# Patient Record
Sex: FEMALE | Race: BLACK OR AFRICAN AMERICAN | NOT HISPANIC OR LATINO | URBAN - NONMETROPOLITAN AREA
[De-identification: names, ages, dates, MRNs, and addresses within clinical notes are randomized per-mention and may not be internally consistent; named-entity substitution may affect disease eponyms.]

---

## 2020-05-19 ENCOUNTER — OTHER- (OUTPATIENT)
Dept: URBAN - NONMETROPOLITAN AREA CLINIC 3 | Facility: CLINIC | Age: 30
Setting detail: DERMATOLOGY
End: 2020-05-19

## 2020-05-19 DIAGNOSIS — B07.0 PLANTAR WART: ICD-10-CM

## 2020-05-19 PROCEDURE — 99201 LEVEL 1 - NEW: CPT

## 2020-05-19 RX ORDER — FLUOCINOLONE ACETONIDE 0.11 MG/ML
1 ML OIL TOPICAL BID
Qty: 118.28 | Refills: 6
Start: 2020-05-19

## 2020-05-19 RX ORDER — KETOCONAZOLE 20 MG/ML
1 APPLICATION SHAMPOO, SUSPENSION TOPICAL AS DIRECTED
Qty: 120 | Refills: 6
Start: 2020-05-19

## 2023-09-28 ENCOUNTER — APPOINTMENT (OUTPATIENT)
Dept: URBAN - NONMETROPOLITAN AREA CLINIC 49 | Age: 33
Setting detail: DERMATOLOGY
End: 2023-09-28

## 2023-09-28 ENCOUNTER — RX ONLY (RX ONLY)
Age: 33
End: 2023-09-28

## 2023-09-28 DIAGNOSIS — L21.8 OTHER SEBORRHEIC DERMATITIS: ICD-10-CM

## 2023-09-28 PROCEDURE — 99204 OFFICE O/P NEW MOD 45 MIN: CPT

## 2023-09-28 PROCEDURE — OTHER PRESCRIPTION MEDICATION MANAGEMENT: OTHER

## 2023-09-28 PROCEDURE — OTHER PRESCRIPTION: OTHER

## 2023-09-28 PROCEDURE — OTHER COUNSELING: OTHER

## 2023-09-28 PROCEDURE — OTHER MEDICATION COUNSELING: OTHER

## 2023-09-28 RX ORDER — DESONIDE 0.5 MG/G
CREAM TOPICAL BID
Qty: 15 | Refills: 6 | Status: CANCELLED | COMMUNITY
Start: 2023-09-28

## 2023-09-28 RX ORDER — TACROLIMUS 1 MG/G
OINTMENT TOPICAL
Qty: 30 | Refills: 11 | Status: ERX | COMMUNITY
Start: 2023-09-28

## 2023-09-28 RX ORDER — PREDNISONE 20 MG/1
TABLET ORAL
Qty: 4 | Refills: 0 | Status: ERX | COMMUNITY
Start: 2023-09-28

## 2023-09-28 RX ORDER — DESONIDE 0.5 MG/G
OINTMENT TOPICAL
Qty: 15 | Refills: 5 | Status: ERX | COMMUNITY
Start: 2023-09-28

## 2023-09-28 ASSESSMENT — LOCATION SIMPLE DESCRIPTION DERM
LOCATION SIMPLE: RIGHT EAR
LOCATION SIMPLE: LEFT EAR
LOCATION SIMPLE: LEFT LIP
LOCATION SIMPLE: LEFT CHEEK
LOCATION SIMPLE: RIGHT FOREHEAD
LOCATION SIMPLE: RIGHT CHEEK

## 2023-09-28 ASSESSMENT — LOCATION DETAILED DESCRIPTION DERM
LOCATION DETAILED: LEFT POSTAURICULAR CREASE
LOCATION DETAILED: RIGHT SUPERIOR MEDIAL BUCCAL CHEEK
LOCATION DETAILED: LEFT INFERIOR CENTRAL MALAR CHEEK
LOCATION DETAILED: LEFT LOWER CUTANEOUS LIP
LOCATION DETAILED: RIGHT INFERIOR FOREHEAD
LOCATION DETAILED: RIGHT POSTERIOR EAR

## 2023-09-28 ASSESSMENT — LOCATION ZONE DERM
LOCATION ZONE: LIP
LOCATION ZONE: FACE
LOCATION ZONE: EAR

## 2023-09-28 NOTE — PROCEDURE: PRESCRIPTION MEDICATION MANAGEMENT
Render In Strict Bullet Format?: No
Initiate Treatment: Prednisone 20 mg tablets once daily for 4 days in the morning. Desonide 0.05% ointment twice daily for rash flare ups. Tacrolimus 0.1% ointment twice daily on affected areas for maintenance.
Samples Given: Cetaphil gentle cleanser.
Detail Level: Zone